# Patient Record
Sex: FEMALE | Race: WHITE | NOT HISPANIC OR LATINO | Employment: UNEMPLOYED | URBAN - METROPOLITAN AREA
[De-identification: names, ages, dates, MRNs, and addresses within clinical notes are randomized per-mention and may not be internally consistent; named-entity substitution may affect disease eponyms.]

---
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
## 2025-04-10 ENCOUNTER — TELEPHONE (OUTPATIENT)
Dept: HEMATOLOGY/ONCOLOGY | Facility: CLINIC | Age: 20
End: 2025-04-10
Payer: COMMERCIAL

## 2025-04-10 DIAGNOSIS — F41.1 GAD (GENERALIZED ANXIETY DISORDER): Primary | ICD-10-CM

## 2025-07-14 ENCOUNTER — DOCUMENTATION ONLY (OUTPATIENT)
Dept: HEMATOLOGY/ONCOLOGY | Facility: CLINIC | Age: 20
End: 2025-07-14
Payer: COMMERCIAL

## 2025-07-14 ENCOUNTER — TELEPHONE (OUTPATIENT)
Dept: HEMATOLOGY/ONCOLOGY | Facility: CLINIC | Age: 20
End: 2025-07-14
Payer: COMMERCIAL

## 2025-07-14 ENCOUNTER — PATIENT MESSAGE (OUTPATIENT)
Dept: HEMATOLOGY/ONCOLOGY | Facility: CLINIC | Age: 20
End: 2025-07-14
Payer: COMMERCIAL

## 2025-07-14 NOTE — PROGRESS NOTES
"Pharmacogenomics (PGx) Consult     Chief Complaint: Renita Ledesma has undergone pharmacogenomic testing via self-enrollment in the [email or web address] Plan PGx . Patient completed sample collection on 5/21/2025 and results were provided on 5/30/2025.     Test results: Pharmacogenomic results can be found in GENOMIC INDICATORS    Past Medical History:  No past medical history on file.     Allergies: Review of patient's allergies indicates:  No Known Allergies     Medications: Current Medications[1]     -Called patient and she reported taking citalopram 20mg daily, glycopyrrolate, and lisdexamfetamine.      Clinical Recommendations based on PGx    -Patient is an intermediate metabolizer of citalopram of which may increase their risk of therapeutic failure due to likely decreased plasma concentrations. Per conversation with patient, she reports it does help with anxiety but has times where she is unsure how much it is helping. Since patient is currently tolerating/finding benefit with this medication at current dose, would NOT recommend making changes based solely on pharmacogenomic results. Encouraged her to discuss medication with her PCP who prescribes citalopram and to share the Pgx results.     -None of the patient's other current medications are likely impacted by their pharmacogenomic results.     Future Medication Considerations based on PGx    -Patient has 1 PGx results that can impact future medication selection- please see "Clinically Actionable PGx Results" for full list of medication interactions    -For future antidepressant prescribing recommendations, use the "Antidepressants with PGx" Smart Set under Orders to view personalized antidepressant recommendations based on the patients Pgx     -If there is an interaction with any future medications and the patient's genetic results, a clinical alert will fire for both providers and pharmacists. These interactions are reviewed by the PGx team and updated as " "new data becomes available.      -PGx information can be accessed by patients within [employer]- results with "actionable result DETECTED" should be communicated to providers outside of [facility] as our clinical alerts will NOT fire    Clinically Actionable PGx Results   *Note, actionable recommendations may change based on updates to existing PGx literature. For the most up to date medication recommendations based on patients' results, please view the GENOMIC INDICATORS module in Epic.    Genomic Indicators        ZZT3P20 Rapid Metabolizer Updated 5/30/2025 by Security, Standard Interface User      Genomic results predict that this patient may metabolize NNQ6O56 substrates at a rate that exceeds average metabolic capacity. Thus, this patient may have an increased risk of adverse or poor response to medications metabolized by SNL9U81. To avoid these types of responses, dose adjustments or alternative therapeutic agents may be necessary when medications metabolized by UGI5G08 are being considered. For questions, call [phone] ([phone]) or order PGx Consult [[id number]].         Rapid Metabolizer of Voriconazole        This patient has a IVV5E72 genomic indicator which indicates increased metabolism of voriconazole. The probability of attainment of therapeutic voriconazole concentrations is small to modest. Consider an alternative drug not dependent on ILZ5O77 metabolism, such as isavuconazole, liposomal amphotericin B, or posaconazole.     See [facility]IC clinical guidelines for more information.                Rapid Metabolizer of Amitriptyline        This patient has JQA0H78 and/or CYP2D6 genomic indicators which indicate altered metabolism of tricyclic antidepressants such as amitriptyline and potential for lack of efficacy. Consider an alternative drug not metabolized by ARP3T14/CYP2D6.     See [facility]IC clinical guidelines for more information.                Rapid Metabolizer of Citalopram        This patient has a SIZ1R63 genomic " indicator which indicates increased metabolism of citalopram/escitalopram and risk of pharmacotherapy failure. Consider an alternative drug not predominantly metabolized by PVA9N59.     See [facility]IC clinical guidelines for more information.                Rapid Metabolizer of Dexlansoprazole        The patient has a ANZ4F34 genomic indicator which indicates increased metabolism of PPIs and reduced efficacy. If using for treatment of Helicobacter pylori infection or erosive esophagitis, consider increasing daily dose by % or choosing an alternative PPI not impacted by LZL9L96 (esomeprazole, raberprazole).     See [facility]IC clinical guidelines for more information.                Rapid Metabolizer of Escitalopram        This patient has a ACZ7X57 genomic indicator which indicates increased metabolism of citalopram/escitalopram and risk of pharmacotherapy failure. Consider an alternative drug not predominantly metabolized by NHI9G49.     See CPIC clinical guidelines for more information.                Rapid Metabolizer of Lansoprazole        The patient has a VYR1Q22 genomic indicator which indicates increased metabolism of PPIs and reduced efficacy. If using for treatment of Helicobacter pylori infection or erosive esophagitis, consider increasing daily dose by % or choosing an alternative PPI not impacted by TQW1N30 (esomeprazole, raberprazole).     See CPIC clinical guidelines for more information.                Rapid Metabolizer of Omeprazole        The patient has a PPN1X61 genomic indicator which indicates increased metabolism of PPIs and reduced efficacy. If using for treatment of Helicobacter pylori infection or erosive esophagitis, consider increasing daily dose by % or choosing an alternative PPI not impacted by DEG7I20 (esomeprazole, raberprazole).     See [facility]IC clinical guidelines for more information.                Rapid Metabolizer of Pantoprazole        The patient has a LIR3A61 genomic  indicator which indicates increased metabolism of PPIs and reduced efficacy. If using for treatment of Helicobacter pylori infection or erosive esophagitis, consider increasing daily dose by % or choosing an alternative PPI not impacted by LBE3C83 (esomeprazole, raberprazole).     See [facility] clinical guidelines for more information.               Reference Links    CPIC® Guideline for Clopidogrel and IGS1N48    CPIC® Guideline for Proton Pump Inhibitors and MEE5N70    CPIC® Guideline for Voriconazole and VOF9T95    CPIC® Guideline for Serotonin Reuptake Inhibitor Antidepressants and CYP2D6, AJK1U23, CYP2B6, SLC6A4, and HTR2A    CPIC® Guideline for Tricyclic Antidepressants and CYP2D6 and SAT7B17                    -For any additional questions, please don't hesitate to reach out to me or contact [phone] (GENE).    Madeline Mendoza, PharmD   Clinical Pharmacogenomics Pharmacist               [1] No current outpatient medications on file.

## 2025-07-14 NOTE — Clinical Note
Hello,   Just wanted to bring to your awareness that Renita Ledesma had pharmacogenomics (PGx) testing done through a  initiative [facility] is currently offering for select patients who are taking medications that may be impacted by genetic results.   As part of this , I have reviewed their genetic results and current medications to help provide insight into current medication optimization options, as well as future medication choices as appropriate.   For any future medications prescribed by any [facility] provider, if there is an interaction with the medication and the patient's genetic results, a clinical alert will fire for both providers and pharmacists. These interactions are reviewed by the PGx team monthly and updated as new data becomes available.   If you have any questions about the results or PGx at [facility] in general, please don't hesitate to reach out to me directly.   Madeline Lauren, PharmD  Clinical Pharmacogenomics Pharmacist